# Patient Record
Sex: FEMALE | URBAN - METROPOLITAN AREA
[De-identification: names, ages, dates, MRNs, and addresses within clinical notes are randomized per-mention and may not be internally consistent; named-entity substitution may affect disease eponyms.]

---

## 2023-08-06 ENCOUNTER — HOSPITAL ENCOUNTER (EMERGENCY)
Facility: HOSPITAL | Age: 43
Discharge: HOME | End: 2023-08-06

## 2023-08-06 VITALS
WEIGHT: 165.12 LBS | HEIGHT: 60 IN | HEART RATE: 75 BPM | BODY MASS INDEX: 32.42 KG/M2 | SYSTOLIC BLOOD PRESSURE: 119 MMHG | TEMPERATURE: 97.5 F | OXYGEN SATURATION: 100 % | RESPIRATION RATE: 18 BRPM | DIASTOLIC BLOOD PRESSURE: 99 MMHG

## 2023-08-06 ASSESSMENT — PAIN DESCRIPTION - LOCATION: LOCATION: ABDOMEN

## 2023-08-06 ASSESSMENT — PAIN SCALES - GENERAL: PAINLEVEL_OUTOF10: 10

## 2023-08-06 ASSESSMENT — PAIN DESCRIPTION - ORIENTATION: ORIENTATION: RIGHT

## 2023-08-07 NOTE — ED NOTES
Patient presents to triage via wheelchair complaining of abdominal pain. Patient is actively vomiting. Patient reports leaving [facility] to travel home to [place]. Patient states she has a SBO and her pain was controlled by IV morphine. Patient advised that medication would only be given after she was seen by a doctor. Patient reports she was being treating her like a drug seeker. Patient advised our triage process was only being explained. Patient states that we could check MYChart for all results. Chart review completed, patient advised that no notes were in the chart. Patient then stated that the results were on a disc. Patient reports she is also upset her  and son were left in the waiting room. Patient advised I was only completing her triage and she would be returned to her family. Patient's documents received, copied and returned. Patient reports to registration that at this time she is not willing to wait in the waiting room, she will leave. Rinku Solano MD notified in the doc boz. Rosi KANG notified too.       Zeina Beasley RN  08/06/23 [id number]